# Patient Record
Sex: FEMALE | Race: OTHER | URBAN - METROPOLITAN AREA
[De-identification: names, ages, dates, MRNs, and addresses within clinical notes are randomized per-mention and may not be internally consistent; named-entity substitution may affect disease eponyms.]

---

## 2018-01-03 ENCOUNTER — EMERGENCY (EMERGENCY)
Facility: HOSPITAL | Age: 23
LOS: 1 days | Discharge: HOME | End: 2018-01-03
Admitting: EMERGENCY MEDICINE
Payer: SELF-PAY

## 2018-01-03 VITALS
DIASTOLIC BLOOD PRESSURE: 80 MMHG | RESPIRATION RATE: 18 BRPM | OXYGEN SATURATION: 99 % | TEMPERATURE: 99 F | HEART RATE: 80 BPM | SYSTOLIC BLOOD PRESSURE: 118 MMHG
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
## 2018-01-03 DIAGNOSIS — S61.012A LACERATION WITHOUT FOREIGN BODY OF LEFT THUMB WITHOUT DAMAGE TO NAIL, INITIAL ENCOUNTER: ICD-10-CM

## 2018-01-03 DIAGNOSIS — Z23 ENCOUNTER FOR IMMUNIZATION: ICD-10-CM

## 2018-01-03 PROCEDURE — 12001 RPR S/N/AX/GEN/TRNK 2.5CM/<: CPT

## 2018-01-03 PROCEDURE — 99283 EMERGENCY DEPT VISIT LOW MDM: CPT | Mod: 25

## 2018-01-03 PROCEDURE — 90471 IMMUNIZATION ADMIN: CPT

## 2018-01-03 PROCEDURE — 90715 TDAP VACCINE 7 YRS/> IM: CPT

## 2018-01-03 RX ORDER — ACETAMINOPHEN 500 MG
650 TABLET ORAL ONCE
Qty: 0 | Refills: 0 | Status: COMPLETED | OUTPATIENT
Start: 2018-01-03 | End: 2018-01-03

## 2018-01-03 RX ORDER — TETANUS TOXOID, REDUCED DIPHTHERIA TOXOID AND ACELLULAR PERTUSSIS VACCINE, ADSORBED 5; 2.5; 8; 8; 2.5 [IU]/.5ML; [IU]/.5ML; UG/.5ML; UG/.5ML; UG/.5ML
0.5 SUSPENSION INTRAMUSCULAR ONCE
Qty: 0 | Refills: 0 | Status: COMPLETED | OUTPATIENT
Start: 2018-01-03 | End: 2018-01-03

## 2018-01-03 RX ADMIN — Medication 650 MILLIGRAM(S): at 16:55

## 2018-01-03 RX ADMIN — Medication 650 MILLIGRAM(S): at 16:52

## 2018-01-03 RX ADMIN — TETANUS TOXOID, REDUCED DIPHTHERIA TOXOID AND ACELLULAR PERTUSSIS VACCINE, ADSORBED 0.5 MILLILITER(S): 5; 2.5; 8; 8; 2.5 SUSPENSION INTRAMUSCULAR at 16:53

## 2018-01-03 NOTE — ED PROVIDER NOTE - MEDICAL DECISION MAKING DETAILS
21 y/o female with superficial laceration x2 located on the left thumb. Repaired with dermabond. PE: normal rom, with sensation intact with 2+ pulses. pt tetanus updated with pain management. pt will fu with pmd.

## 2018-01-03 NOTE — ED PROVIDER NOTE - OBJECTIVE STATEMENT
21 y/o female with no PMhx is present with laceration to left thumb. Pt states that she was cutting an apple at work when the apple slicer, sliced her left thumb when an apple became stuck. Pt able to move her thumb. Denies numbing/tingling and denies recent tetanus vaccination.

## 2018-01-03 NOTE — ED ADULT TRIAGE NOTE - ARRIVAL INFO ADDITIONAL COMMENTS
used a "apple cutter" at work and cut her finger. 3 lacs noted to left thumb. c.o pain and slight numbness. full mobility noted to digit. denies any use of blood thinners. no active bleeding noted. states she had her last tetanus 4 yrs ago.

## 2023-03-20 NOTE — ED PROVIDER NOTE - CROS ED ROS STATEMENT
Bedside shift change report given to [employer] (oncoming nurse) by Drew Rodgers (offgoing nurse). Report included the following information SBAR, Kardex, ED Summary, Intake/Output, MAR, Recent Results, Med Rec Status, and Cardiac Rhythm SB with PVCs . all other ROS negative except as per HPI